# Patient Record
Sex: FEMALE | Race: BLACK OR AFRICAN AMERICAN | NOT HISPANIC OR LATINO | URBAN - METROPOLITAN AREA
[De-identification: names, ages, dates, MRNs, and addresses within clinical notes are randomized per-mention and may not be internally consistent; named-entity substitution may affect disease eponyms.]

---

## 2018-04-18 ENCOUNTER — EMERGENCY (EMERGENCY)
Facility: HOSPITAL | Age: 43
LOS: 0 days | Discharge: HOME | End: 2018-04-18
Attending: EMERGENCY MEDICINE
Payer: COMMERCIAL

## 2018-04-18 VITALS
OXYGEN SATURATION: 100 % | TEMPERATURE: 99 F | SYSTOLIC BLOOD PRESSURE: 177 MMHG | RESPIRATION RATE: 18 BRPM | HEART RATE: 79 BPM | WEIGHT: 214.07 LBS | HEIGHT: 65 IN | DIASTOLIC BLOOD PRESSURE: 119 MMHG

## 2018-04-18 VITALS
DIASTOLIC BLOOD PRESSURE: 99 MMHG | SYSTOLIC BLOOD PRESSURE: 168 MMHG | RESPIRATION RATE: 16 BRPM | OXYGEN SATURATION: 100 % | HEART RATE: 68 BPM

## 2018-04-18 PROCEDURE — 99283 EMERGENCY DEPT VISIT LOW MDM: CPT

## 2018-04-18 RX ORDER — TRAMADOL HYDROCHLORIDE 50 MG/1
50 TABLET ORAL ONCE
Qty: 0 | Refills: 0 | Status: DISCONTINUED | OUTPATIENT
Start: 2018-04-18 | End: 2018-04-18

## 2018-04-18 RX ORDER — DIAZEPAM 5 MG
1 TABLET ORAL
Qty: 9 | Refills: 0
Start: 2018-04-18 | End: 2018-04-20

## 2018-04-18 RX ORDER — DIAZEPAM 5 MG
5 TABLET ORAL ONCE
Qty: 0 | Refills: 0 | Status: DISCONTINUED | OUTPATIENT
Start: 2018-04-18 | End: 2018-04-18

## 2018-04-18 RX ORDER — TRAMADOL HYDROCHLORIDE 50 MG/1
1 TABLET ORAL
Qty: 12 | Refills: 0
Start: 2018-04-18 | End: 2018-04-20

## 2018-04-18 RX ORDER — IBUPROFEN 200 MG
1 TABLET ORAL
Qty: 28 | Refills: 0
Start: 2018-04-18 | End: 2018-04-24

## 2018-04-18 RX ORDER — KETOROLAC TROMETHAMINE 30 MG/ML
60 SYRINGE (ML) INJECTION ONCE
Qty: 0 | Refills: 0 | Status: DISCONTINUED | OUTPATIENT
Start: 2018-04-18 | End: 2018-04-18

## 2018-04-18 RX ADMIN — TRAMADOL HYDROCHLORIDE 50 MILLIGRAM(S): 50 TABLET ORAL at 12:35

## 2018-04-18 RX ADMIN — Medication 60 MILLIGRAM(S): at 12:26

## 2018-04-18 RX ADMIN — Medication 5 MILLIGRAM(S): at 12:35

## 2018-04-18 NOTE — ED ADULT NURSE NOTE - OBJECTIVE STATEMENT
received pt sitting on stretcher c/o pain to the buttocks radiating to the leg rt side pain on and getting worse.

## 2018-04-18 NOTE — ED PROVIDER NOTE - OBJECTIVE STATEMENT
42 year old female with HTN hx presenting to ED due to R sided buttock pain radiating from there to behind thigh. States feeling some persistent pain coming and going with intensity. No numbness/tingling. Pt works as a home health aide

## 2018-04-18 NOTE — ED ADULT TRIAGE NOTE - CHIEF COMPLAINT QUOTE
Pt c/o of intermittent pain on the right buttock radiating down to the right leg, 2 weeks. BP high in triage. Pt has hx of high BP and reported taking all her med 15 min ago

## 2018-04-18 NOTE — ED PROVIDER NOTE - MUSCULOSKELETAL MINIMAL EXAM
R upper buttock area increased tone - tender focally - reproduced pain radiating down R thigh with compression to area. No midline C/T/L spine tenderness

## 2018-04-19 DIAGNOSIS — M54.30 SCIATICA, UNSPECIFIED SIDE: ICD-10-CM

## 2018-04-19 DIAGNOSIS — Z79.1 LONG TERM (CURRENT) USE OF NON-STEROIDAL ANTI-INFLAMMATORIES (NSAID): ICD-10-CM

## 2018-04-19 DIAGNOSIS — M79.1 MYALGIA: ICD-10-CM

## 2018-04-19 DIAGNOSIS — I10 ESSENTIAL (PRIMARY) HYPERTENSION: ICD-10-CM

## 2018-05-11 ENCOUNTER — EMERGENCY (EMERGENCY)
Facility: HOSPITAL | Age: 43
LOS: 0 days | Discharge: HOME | End: 2018-05-11
Attending: EMERGENCY MEDICINE
Payer: COMMERCIAL

## 2018-05-11 VITALS
HEART RATE: 85 BPM | TEMPERATURE: 98 F | RESPIRATION RATE: 19 BRPM | DIASTOLIC BLOOD PRESSURE: 91 MMHG | OXYGEN SATURATION: 99 % | SYSTOLIC BLOOD PRESSURE: 142 MMHG

## 2018-05-11 VITALS
TEMPERATURE: 99 F | OXYGEN SATURATION: 98 % | RESPIRATION RATE: 19 BRPM | DIASTOLIC BLOOD PRESSURE: 120 MMHG | HEART RATE: 96 BPM | SYSTOLIC BLOOD PRESSURE: 188 MMHG | WEIGHT: 205.91 LBS | HEIGHT: 65 IN

## 2018-05-11 DIAGNOSIS — Z79.1 LONG TERM (CURRENT) USE OF NON-STEROIDAL ANTI-INFLAMMATORIES (NSAID): ICD-10-CM

## 2018-05-11 DIAGNOSIS — M54.5 LOW BACK PAIN: ICD-10-CM

## 2018-05-11 DIAGNOSIS — I10 ESSENTIAL (PRIMARY) HYPERTENSION: ICD-10-CM

## 2018-05-11 DIAGNOSIS — M54.16 RADICULOPATHY, LUMBAR REGION: ICD-10-CM

## 2018-05-11 PROCEDURE — 99283 EMERGENCY DEPT VISIT LOW MDM: CPT

## 2018-05-11 RX ORDER — LABETALOL HCL 100 MG
200 TABLET ORAL ONCE
Qty: 0 | Refills: 0 | Status: COMPLETED | OUTPATIENT
Start: 2018-05-11 | End: 2018-05-11

## 2018-05-11 RX ORDER — AMLODIPINE BESYLATE 2.5 MG/1
10 TABLET ORAL ONCE
Qty: 0 | Refills: 0 | Status: COMPLETED | OUTPATIENT
Start: 2018-05-11 | End: 2018-05-11

## 2018-05-11 RX ORDER — LISINOPRIL 2.5 MG/1
10 TABLET ORAL DAILY
Qty: 0 | Refills: 0 | Status: DISCONTINUED | OUTPATIENT
Start: 2018-05-11 | End: 2018-05-11

## 2018-05-11 RX ORDER — DIAZEPAM 5 MG
5 TABLET ORAL ONCE
Qty: 0 | Refills: 0 | Status: DISCONTINUED | OUTPATIENT
Start: 2018-05-11 | End: 2018-05-11

## 2018-05-11 RX ORDER — DIAZEPAM 5 MG
1 TABLET ORAL
Qty: 6 | Refills: 0
Start: 2018-05-11 | End: 2018-05-12

## 2018-05-11 RX ORDER — IBUPROFEN 200 MG
600 TABLET ORAL ONCE
Qty: 0 | Refills: 0 | Status: COMPLETED | OUTPATIENT
Start: 2018-05-11 | End: 2018-05-11

## 2018-05-11 RX ORDER — IBUPROFEN 200 MG
1 TABLET ORAL
Qty: 15 | Refills: 0
Start: 2018-05-11 | End: 2018-05-15

## 2018-05-11 RX ADMIN — Medication 600 MILLIGRAM(S): at 11:40

## 2018-05-11 RX ADMIN — Medication 5 MILLIGRAM(S): at 11:50

## 2018-05-11 RX ADMIN — AMLODIPINE BESYLATE 10 MILLIGRAM(S): 2.5 TABLET ORAL at 11:50

## 2018-05-11 RX ADMIN — Medication 600 MILLIGRAM(S): at 11:53

## 2018-05-11 RX ADMIN — LISINOPRIL 10 MILLIGRAM(S): 2.5 TABLET ORAL at 11:53

## 2018-05-11 RX ADMIN — Medication 200 MILLIGRAM(S): at 11:51

## 2018-05-11 NOTE — ED ADULT TRIAGE NOTE - CHIEF COMPLAINT QUOTE
"my sciatic nerve and right leg is acting up on me"   c/o Lower back pain and right leg pain  x 2 years , getting worse

## 2018-05-11 NOTE — ED PROVIDER NOTE - OBJECTIVE STATEMENT
42 years old female walked in c/o right lower back pain intermittently for three weeks. Pt sts right lower back pain radiates to right upper leg increase with movements and getting up from a sitting position. Pt sts she has a hx of lower back pain had mri of back showed right lumbar bulging discs two years ago. Pt denies recent trauma. Pt also sts she did not take her Norvasc 10 mg and Labetalol 200 mg this morning. Pt denies headache, dizziness, blurred visions, light sensitivities, focal/distal weakness or numbness, uncontrolled urinations or bowel movements, cough, sob, chest pain, nausea, vomiting, fever, chills, abd pain, dysuria, vaginal spotting or discharge or irregular bowel movements. Pt sts she is not at risk of being pregnant. Pt sts she does not need refill on her blood pressure medications.

## 2018-05-11 NOTE — ED PROVIDER NOTE - NEUROLOGICAL, MLM
Alert and oriented, no focal deficits, no motor or sensory deficits. Pt is able to perform straight leg raising on standing position bilaterally

## 2018-05-11 NOTE — ED PROVIDER NOTE - CONSTITUTIONAL, MLM
normal... Well appearing, well nourished, awake, alert, oriented to person, place, time/situation and in no apparent distress. Speaking in clear full sentences smiling laughing appears very comfortable sitting up in the chair in a bright light room.

## 2018-05-11 NOTE — ED PROVIDER NOTE - MUSCULOSKELETAL MINIMAL EXAM
motor intact/neck supple/MUSCLE SPASMS/TENDERNESS/right para lumbar muscle L3 to S1/normal range of motion

## 2019-01-22 NOTE — ED PROVIDER NOTE - VASCULAR COMPROMISE
28year old with no personal history of cancer  Family history includes maternal grandmother with breast cancer, maternal uncle with melanoma and paternal grandmother with gastric cancer  Family history reviewed via telephone consultation with Genetic Counselor at [facility]  Based on rarity of melanoma and gastric cancer, multipanel testing deemed appropriate  We discussed risk and benefits of testing  She understands the possible outcomes of testing including no pathogenic mutation, a positive pathogenic mutation and VUS  We also discussed prophylactic procedures/screening in the event a genetic mutation identified, as well as implications for patient's family members if mutation identified  Patient agrees to proceed with testing  Blood sample collected and sent to [facility]  I will call the patient with results in 2-4 weeks  The patient will undergo formal genetic counseling and appropriate referrals will be made in the event of a positive finding  pulses full and equal bilaterally/no vascular compromise

## 2022-07-21 NOTE — ED PROVIDER NOTE - CONSTITUTIONAL NEGATIVE STATEMENT, MLM
no fever and no chills. Recognizes 2 fingers or can read (II)/Opens mouth, sticks out tongue (V, XII)/Hearing intact (VIII)

## 2023-01-19 ENCOUNTER — EMERGENCY (EMERGENCY)
Facility: HOSPITAL | Age: 48
LOS: 0 days | Discharge: HOME | End: 2023-01-19
Payer: COMMERCIAL

## 2023-01-19 VITALS
DIASTOLIC BLOOD PRESSURE: 141 MMHG | HEART RATE: 73 BPM | RESPIRATION RATE: 19 BRPM | SYSTOLIC BLOOD PRESSURE: 188 MMHG | TEMPERATURE: 98 F | OXYGEN SATURATION: 98 %

## 2023-01-19 VITALS
OXYGEN SATURATION: 95 % | TEMPERATURE: 99 F | DIASTOLIC BLOOD PRESSURE: 116 MMHG | HEART RATE: 72 BPM | HEIGHT: 65 IN | SYSTOLIC BLOOD PRESSURE: 188 MMHG | RESPIRATION RATE: 20 BRPM | WEIGHT: 177.03 LBS
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
## 2023-01-19 DIAGNOSIS — S61.215A LACERATION WITHOUT FOREIGN BODY OF LEFT RING FINGER WITHOUT DAMAGE TO NAIL, INITIAL ENCOUNTER: ICD-10-CM

## 2023-01-19 DIAGNOSIS — Z23 ENCOUNTER FOR IMMUNIZATION: ICD-10-CM
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
## 2023-01-19 DIAGNOSIS — I10 ESSENTIAL (PRIMARY) HYPERTENSION: ICD-10-CM

## 2023-01-19 PROBLEM — M54.5 LOW BACK PAIN: Chronic | Status: ACTIVE | Noted: 2018-05-11

## 2023-01-19 PROCEDURE — 12001 RPR S/N/AX/GEN/TRNK 2.5CM/<: CPT

## 2023-01-19 PROCEDURE — 99284 EMERGENCY DEPT VISIT MOD MDM: CPT | Mod: 25

## 2023-01-19 RX ORDER — IBUPROFEN 200 MG
600 TABLET ORAL ONCE
Refills: 0 | Status: COMPLETED | OUTPATIENT
Start: 2023-01-19 | End: 2023-01-19

## 2023-01-19 RX ORDER — TETANUS TOXOID, REDUCED DIPHTHERIA TOXOID AND ACELLULAR PERTUSSIS VACCINE, ADSORBED 5; 2.5; 8; 8; 2.5 [IU]/.5ML; [IU]/.5ML; UG/.5ML; UG/.5ML; UG/.5ML
0.5 SUSPENSION INTRAMUSCULAR ONCE
Refills: 0 | Status: COMPLETED | OUTPATIENT
Start: 2023-01-19 | End: 2023-01-19

## 2023-01-19 RX ADMIN — Medication 600 MILLIGRAM(S): at 16:57

## 2023-01-19 RX ADMIN — TETANUS TOXOID, REDUCED DIPHTHERIA TOXOID AND ACELLULAR PERTUSSIS VACCINE, ADSORBED 0.5 MILLILITER(S): 5; 2.5; 8; 8; 2.5 SUSPENSION INTRAMUSCULAR at 16:57

## 2023-01-19 NOTE — ED PROVIDER NOTE - OBJECTIVE STATEMENT
46 y/o F pmhx HTN complains of laceration to left 4th finger from cutting on a broken glass mug. she states that she was trying to stop the bleeding but it wouldn't stop. denies numbness/tingling. unknown last tetanus.

## 2023-01-19 NOTE — ED PROVIDER NOTE - PATIENT PORTAL LINK FT
You can access the FollowMyHealth Patient Portal offered by [facility] by registering at the following website: [email or web address]. By joining [employer]’s FollowMyHealth portal, you will also be able to view your health information using other applications (apps) compatible with our system.

## 2023-01-19 NOTE — ED PROVIDER NOTE - CLINICAL SUMMARY MEDICAL DECISION MAKING FREE TEXT BOX
48 y/o F pmhx HTN complains of laceration to left 4th finger from cutting on a broken glass mug. she states that she was trying to stop the bleeding but it wouldn't stop. denies numbness/tingling. unknown last tetanus. -- wound thoroughly cleaned. Dermabond applied, tetanus updated

## 2023-01-19 NOTE — ED ADULT TRIAGE NOTE - CHIEF COMPLAINT QUOTE
Left 5th finger laceration from broken glass while washing dishes this morning. c/o pain and excessive bleeding, pressure dressing in place by patient.

## 2023-01-19 NOTE — ED PROVIDER NOTE - CHIEF COMPLAINT
Have patient increase her lisinopril to 40 mg daily.  She may take 2 of her 20 mg medications at home.  Have her keep her appointment on October 17, 2019 as well.   The patient is a 47y Female complaining of lacerations.

## 2023-01-19 NOTE — ED PROVIDER NOTE - NSFOLLOWUPINSTRUCTIONS_ED_ALL_ED_FT
Advance activity as tolerated.  Continue all previously prescribed medications as directed unless otherwise instructed.  Follow up with your primary care physician in 48-72 hours- bring copies of your results.      You had a laceration repaired with dermabond a skin glue to help with the healing.     Return to the ER for worsening or persistent symptoms, and/or ANY NEW OR CONCERNING SYMPTOMS. If you have issues obtaining follow up, please call: [phone] to obtain a doctor or specialist who takes your insurance in your area.  You may call [phone] to make an appointment with the internal medicine clinic.     Take Motrin 600 mg every 8 hours as needed for moderate pain or - take with food.

## 2023-01-19 NOTE — ED ADULT NURSE NOTE - OBJECTIVE STATEMENT
Pt AOx4, responsive, ambulatory. Pt c/o laceration of left hand, 5th digit while washing dishes with broken glass. Bleeding stopped by patient at home; no bleeding at this time. NKA. PMH of HTN. denies blood thinner use.

## 2023-03-02 ENCOUNTER — EMERGENCY (EMERGENCY)
Facility: HOSPITAL | Age: 48
LOS: 0 days | Discharge: HOME | End: 2023-03-02
Attending: EMERGENCY MEDICINE
Payer: COMMERCIAL

## 2023-03-02 VITALS
RESPIRATION RATE: 19 BRPM | TEMPERATURE: 98 F | SYSTOLIC BLOOD PRESSURE: 171 MMHG | HEART RATE: 78 BPM | OXYGEN SATURATION: 98 % | HEIGHT: 65 IN | WEIGHT: 169.98 LBS | DIASTOLIC BLOOD PRESSURE: 100 MMHG

## 2023-03-02 VITALS — HEART RATE: 60 BPM | SYSTOLIC BLOOD PRESSURE: 183 MMHG | DIASTOLIC BLOOD PRESSURE: 136 MMHG
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
## 2023-03-02 DIAGNOSIS — I10 ESSENTIAL (PRIMARY) HYPERTENSION: ICD-10-CM
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
## 2023-03-02 DIAGNOSIS — M25.462 EFFUSION, LEFT KNEE: ICD-10-CM

## 2023-03-02 DIAGNOSIS — S83.92XA SPRAIN OF UNSPECIFIED SITE OF LEFT KNEE, INITIAL ENCOUNTER: ICD-10-CM

## 2023-03-02 DIAGNOSIS — M25.562 PAIN IN LEFT KNEE: ICD-10-CM

## 2023-03-02 PROCEDURE — 99284 EMERGENCY DEPT VISIT MOD MDM: CPT

## 2023-03-02 PROCEDURE — 73564 X-RAY EXAM KNEE 4 OR MORE: CPT | Mod: 26,LT

## 2023-03-02 RX ORDER — AMLODIPINE BESYLATE 2.5 MG/1
1 TABLET ORAL
Qty: 0 | Refills: 0 | DISCHARGE

## 2023-03-02 RX ORDER — IBUPROFEN 200 MG
600 TABLET ORAL ONCE
Refills: 0 | Status: COMPLETED | OUTPATIENT
Start: 2023-03-02 | End: 2023-03-02

## 2023-03-02 RX ADMIN — Medication 600 MILLIGRAM(S): at 15:20

## 2023-03-02 RX ADMIN — Medication 600 MILLIGRAM(S): at 16:18

## 2023-03-02 NOTE — ED PROVIDER NOTE - CARE PROVIDER_API CALL
Marc Simmons (DO)  Orthopaedic Surgery Surgery  [address] [facility], [address] [place] [address]  Phone: [phone]  Fax: [phone]  Follow Up Time: 1-3 Days

## 2023-03-02 NOTE — ED PROVIDER NOTE - CLINICAL SUMMARY MEDICAL DECISION MAKING FREE TEXT BOX
pt with exam noted mild joint swelling - ROM intact and full xray knee without acute findings - will dc with orthopedics follow up if not improved however in 1-2 weeks.

## 2023-03-02 NOTE — ED PROVIDER NOTE - MUSCULOSKELETAL MINIMAL EXAM
left knee with swelling around joint, no discoloration, ROM intact and full, patella intact, nontender, medial aspect of prepatellar region with soft tissue swelling and mild tenderness noted

## 2023-03-02 NOTE — ED PROVIDER NOTE - PATIENT PORTAL LINK FT
You can access the FollowMyHealth Patient Portal offered by [facility] by registering at the following website: [email or web address]. By joining [employer]’s FollowMyHealth portal, you will also be able to view your health information using other applications (apps) compatible with our system.

## 2023-03-02 NOTE — ED ADULT NURSE NOTE - OBJECTIVE STATEMENT
Pt is a 47y female AOx4 c/o L knee pain (9/10) and swelling after a fall. Pt states she was walking dog yesterday when she slipped and fell on ice. Denies hitting head, knee is only complaint. Edema noted to L knee, pt says knee feels stiff and difficult to move. Denies numbness, tingling. Hx htn, takes amlodipine 10mg at home, did not take med this morning.

## 2023-03-02 NOTE — ED PROVIDER NOTE - OBJECTIVE STATEMENT
47 year old female without significant pMH presenting to ED due to left knee pain s/p fall while walking - falling due to icy pavement - pt states there has been some swelling and pain on walking due to limitation of mobility from knee swelling. Denies any other injury otherwise.

## 2023-03-02 NOTE — ED ADULT NURSE NOTE - CHIEF COMPLAINT QUOTE
pt states " I slip and fell on ice yesterday. " pt c/o left knee pain and swelling. denies head injury or loc. history of htn. bp at triage is 171/100

## 2024-11-11 ENCOUNTER — APPOINTMENT (OUTPATIENT)
Dept: GASTROENTEROLOGY | Facility: CLINIC | Age: 49
End: 2024-11-11